# Patient Record
Sex: MALE | URBAN - METROPOLITAN AREA
[De-identification: names, ages, dates, MRNs, and addresses within clinical notes are randomized per-mention and may not be internally consistent; named-entity substitution may affect disease eponyms.]

---

## 2021-06-25 ENCOUNTER — EMERGENCY (EMERGENCY)
Facility: HOSPITAL | Age: 26
LOS: 1 days | End: 2021-06-25
Attending: PERSONAL EMERGENCY RESPONSE ATTENDANT
Payer: COMMERCIAL

## 2021-06-25 VITALS
OXYGEN SATURATION: 100 % | HEART RATE: 115 BPM | WEIGHT: 169.98 LBS | SYSTOLIC BLOOD PRESSURE: 154 MMHG | DIASTOLIC BLOOD PRESSURE: 105 MMHG | RESPIRATION RATE: 18 BRPM | HEIGHT: 68 IN | TEMPERATURE: 98 F

## 2021-06-25 VITALS — HEART RATE: 87 BPM

## 2021-06-25 PROCEDURE — 99283 EMERGENCY DEPT VISIT LOW MDM: CPT | Mod: 25

## 2021-06-25 PROCEDURE — 99284 EMERGENCY DEPT VISIT MOD MDM: CPT

## 2021-06-25 PROCEDURE — 93005 ELECTROCARDIOGRAM TRACING: CPT

## 2021-06-25 PROCEDURE — 93010 ELECTROCARDIOGRAM REPORT: CPT

## 2021-06-25 NOTE — ED PROVIDER NOTE - ATTENDING CONTRIBUTION TO CARE
Attending MD Sexton.  Agree with above.  Pt is a 27 yo male with pmhx of anxiety, childhood asthma which has resolved who had 7 beers last night and ~2300 felt palpitations like his heart was pounding, vague SOB.  Dad has albuterol so he took 2 puffs as well as a nebulizer because he felt vaguely SOB and had tingling in bilateral finger tips.  Pt endorses vague chest tightness without pain.  Denies fevers/n/v/d.  Pt’s HR was high 90’s on arrival.  Pt has tolerated sig PO H2O in Ed.  EKG sinus tach.  Pt reassured.  No personal or family hx of heart disease.  EKG non-actionable.  Stable for discharge home.  Return precautions given. Attending MD Sexton.  Agree with above.  Pt is a 27 yo male with pmhx of anxiety, childhood asthma which has resolved who had 7 beers last night and ~2300 felt palpitations like his heart was pounding, vague SOB.  Dad has albuterol so he took 2 puffs as well as a nebulizer because he felt vaguely SOB and had tingling in bilateral finger tips.  Pt endorses vague chest tightness without pain.  Denies fevers/n/v/d.  Pt’s HR was high 90’s on arrival.  Pt has tolerated sig PO H2O in Ed.  EKG sinus tach.  No personal or family hx of heart disease.  Pt reassured and counseled to avoid sig EtOH intake in future and not to take albuterol without dx of ongoing asthma.  Pt counseled to hydrate and f/u with PCP.  Return precautions given.

## 2021-06-25 NOTE — ED PROVIDER NOTE - NS ED ROS FT
Constitutional: no fevers; no chills  HEENT: no visual changes, no sore throat, no rhinorrhea  CV: no cp; palpitations  Resp: no sob; no cough  GI: no abd pain, no nausea, no vomiting, no diarrhea, no constipation  : no dysuria, no hematuria  MSK: no myalgais; no arthralgias  skin: no rashes  neuro: no HA, no numbness; no weakness, no tingling  ROS statement: all other ROS negative except as per HPI

## 2021-06-25 NOTE — ED PROVIDER NOTE - PROGRESS NOTE DETAILS
Chadwick Schuster MD. pt feels well, hr improved. pt to be discharged. mother at bedside. advised to drink a lot of water and to stay hydrated. Instructed patient to follow up with their primary care physician. Return precautions provided. Allowed for questions and all questions answered. Pt to be discharged.

## 2021-06-25 NOTE — ED PROVIDER NOTE - PHYSICAL EXAMINATION
PHYSICAL EXAM:  GENERAL: non-toxic appearing; in no respiratory distress  HEAD Atraumatic, Normocephalic  NECK: No JVD; FROM  EYES: PERRL, EOMs intact b/l w/out deficits; normal conjunctiva  CHEST/LUNG: CTAB no wheezes/rhonchi/rales  HEART: RRR no murmur/gallops/rubs  ABDOMEN: +BS, soft, NT, ND  EXTREMITIES: No LE edema, +2 radial pulses b/l, +2 DP/PT pulses b/l  MUSCULOSKELETAL: FROM of all 4 extremities;  NERVOUS SYSTEM:  A&Ox3, No motor deficits or sensory deficits; CNII-XII intact; no focal neurologic deficits  SKIN:  No new rashes

## 2021-06-25 NOTE — ED PROVIDER NOTE - PATIENT PORTAL LINK FT
You can access the FollowMyHealth Patient Portal offered by [facility] by registering at the following website: [email or web address]. By joining [facility]’s FollowMyHealth portal, you will also be able to view your health information using other applications (apps) compatible with our system.

## 2021-06-25 NOTE — ED PROVIDER NOTE - OBJECTIVE STATEMENT
27 yo M pmhx asthma, anxiety, presents for palpitations with chest tightness since 11pm last night. was trying to sleep but felt his heart was pounding. he tried deep breathing which he thinks helped but then felt SOB, so he took his dad's albuterol and even a nebulized treatment. he then developed tingling to his finger tips. he does admit to drinking 7 beers yesterday as well. he denies n/v/d, abd pain, fever, neck pain, headache, loc. pt now feels better and does not have palpitations.

## 2021-06-25 NOTE — ED ADULT NURSE NOTE - OBJECTIVE STATEMENT
26y male from triage, PMH asthma, anxiety not on medication, AAOx3, complaining of anxiety, shortness of breath, pt endorses about 7 alcoholic drinks starting this afternoon, around 10pm was feeling anxious, shortness of breath, took 3-4 puffs of albuterol and one nebulizer around 11pm, pt reports still feeling anxious, no headache, chest pain, shortness of breath, abd pain, n/v/d, moves all extremities w/+ pulses, placed on CM, bed in lowest position, comfort and safety provided.

## 2021-06-25 NOTE — ED PROVIDER NOTE - CLINICAL SUMMARY MEDICAL DECISION MAKING FREE TEXT BOX
Chadwick Schuster MD. pt w/ anxiety, asthma, presents for palpitations, chest tightness since last night. he admits to drinking 7 beers and even took albuterol tx prior to arrival as he felt sob. pt now feels much improved. pt hd stable. well appearing. ekg is sinus tach and is non ischemic and w/o arrhythmias, prolonged qt, wpw, delta wave. pt's palpitations likely from dehydration from beer and tachycardia is from albuterol tx as well as dehydration. will encourage po intake. mother is at bedside to take pt home. will plan to dc.

## 2021-09-03 PROBLEM — Z00.00 ENCOUNTER FOR PREVENTIVE HEALTH EXAMINATION: Status: ACTIVE | Noted: 2021-09-03

## 2021-09-15 ENCOUNTER — APPOINTMENT (OUTPATIENT)
Dept: UROLOGY | Facility: CLINIC | Age: 26
End: 2021-09-15
Payer: COMMERCIAL

## 2021-09-15 VITALS
SYSTOLIC BLOOD PRESSURE: 116 MMHG | HEART RATE: 80 BPM | WEIGHT: 170 LBS | HEIGHT: 72 IN | BODY MASS INDEX: 23.03 KG/M2 | TEMPERATURE: 97.6 F | DIASTOLIC BLOOD PRESSURE: 78 MMHG

## 2021-09-15 DIAGNOSIS — N52.9 MALE ERECTILE DYSFUNCTION, UNSPECIFIED: ICD-10-CM

## 2021-09-15 PROCEDURE — 99204 OFFICE O/P NEW MOD 45 MIN: CPT

## 2021-09-15 RX ORDER — SILDENAFIL 20 MG/1
20 TABLET ORAL
Qty: 30 | Refills: 0 | Status: ACTIVE | COMMUNITY
Start: 2021-09-15 | End: 1900-01-01

## 2021-10-25 NOTE — ASSESSMENT
[FreeTextEntry1] : check hormone levels \par \par Will try sildenafil side effects reviewed\par More common reviewed- redness or warmth in your face, neck, or chest; cold symptoms such as stuffy nose, sneezing, or sore throat; headache; memory problems; diarrhea, upset stomach; or muscle pain, back pain.\par Stop immediately and got to ER for changes in vision or sudden vision loss; ringing in your ears, or sudden hearing loss; chest pain or heavy feeling, pain spreading to the arm or shoulder, nausea, sweating, general ill feeling; irregular heartbeat; shortness of breath, swelling in your hands or feet; seizure (convulsions); feeling light-headed, fainting; or penis erection that is painful or lasts 4 hours or longer\par

## 2021-10-25 NOTE — HISTORY OF PRESENT ILLNESS
[FreeTextEntry1] : cc ed \par reports penile stretch injury many years ago trying to bend his curvature \par reports reduced sensation since then \par last few weeks decreased strength of erection \par decreased morning erection libido intact

## 2023-06-15 NOTE — PHYSICAL EXAM
[General Appearance - Well Developed] : well developed [General Appearance - Well Nourished] : well nourished [Normal Appearance] : normal appearance [Well Groomed] : well groomed [General Appearance - In No Acute Distress] : no acute distress [Edema] : no peripheral edema [Respiration, Rhythm And Depth] : normal respiratory rhythm and effort [Exaggerated Use Of Accessory Muscles For Inspiration] : no accessory muscle use [Abdomen Soft] : soft [Abdomen Tenderness] : non-tender [Costovertebral Angle Tenderness] : no ~M costovertebral angle tenderness [Urethral Meatus] : meatus normal Vaccine status unknown [Urinary Bladder Findings] : the bladder was normal on palpation [Scrotum] : the scrotum was normal [Testes Mass (___cm)] : there were no testicular masses [No Prostate Nodules] : no prostate nodules [Normal Station and Gait] : the gait and station were normal for the patient's age [No Focal Deficits] : no focal deficits [] : no rash [Oriented To Time, Place, And Person] : oriented to person, place, and time [Affect] : the affect was normal [Mood] : the mood was normal [Not Anxious] : not anxious [No Palpable Adenopathy] : no palpable adenopathy